# Patient Record
Sex: FEMALE | Race: WHITE | Employment: UNEMPLOYED | URBAN - METROPOLITAN AREA
[De-identification: names, ages, dates, MRNs, and addresses within clinical notes are randomized per-mention and may not be internally consistent; named-entity substitution may affect disease eponyms.]

---

## 2017-01-20 RX ORDER — MINOCYCLINE HYDROCHLORIDE 50 MG/1
CAPSULE ORAL
Qty: 60 CAPSULE | Refills: 0 | Status: SHIPPED | OUTPATIENT
Start: 2017-01-20 | End: 2017-02-23 | Stop reason: SDUPTHER

## 2017-02-23 RX ORDER — MINOCYCLINE HYDROCHLORIDE 50 MG/1
CAPSULE ORAL
Qty: 60 CAPSULE | Refills: 0 | Status: SHIPPED | OUTPATIENT
Start: 2017-02-23 | End: 2017-11-29 | Stop reason: SDUPTHER

## 2017-05-11 ENCOUNTER — OFFICE VISIT (OUTPATIENT)
Dept: DERMATOLOGY | Age: 22
End: 2017-05-11

## 2017-05-11 DIAGNOSIS — L70.0 ACNE VULGARIS: Primary | ICD-10-CM

## 2017-05-11 DIAGNOSIS — N92.0 MENORRHAGIA WITH REGULAR CYCLE: ICD-10-CM

## 2017-05-11 DIAGNOSIS — D48.5 NEOPLASM OF UNCERTAIN BEHAVIOR OF SKIN: ICD-10-CM

## 2017-05-11 PROCEDURE — 99214 OFFICE O/P EST MOD 30 MIN: CPT | Performed by: DERMATOLOGY

## 2017-05-11 PROCEDURE — 11100 PR BIOPSY OF SKIN LESION: CPT | Performed by: DERMATOLOGY

## 2017-05-11 RX ORDER — NORGESTIMATE AND ETHINYL ESTRADIOL 0.25-0.035
1 KIT ORAL DAILY
Qty: 1 PACKET | Refills: 11 | Status: SHIPPED | OUTPATIENT
Start: 2017-05-11 | End: 2019-12-17

## 2017-05-17 ENCOUNTER — TELEPHONE (OUTPATIENT)
Dept: DERMATOLOGY | Age: 22
End: 2017-05-17

## 2017-11-30 RX ORDER — MINOCYCLINE HYDROCHLORIDE 50 MG/1
CAPSULE ORAL
Qty: 60 CAPSULE | Refills: 0 | Status: SHIPPED | OUTPATIENT
Start: 2017-11-30 | End: 2018-04-06 | Stop reason: SDUPTHER

## 2018-01-17 ENCOUNTER — TELEPHONE (OUTPATIENT)
Dept: DERMATOLOGY | Age: 23
End: 2018-01-17

## 2018-01-17 NOTE — TELEPHONE ENCOUNTER
Patient called and have questions about her birthcontrol. She has had 3 periods in the last month. She also is on minocycline (MINOCIN;DYNACIN) 50 MG capsule and wondering if this is causing the period problem.     Call back # [phone]

## 2018-04-06 RX ORDER — MINOCYCLINE HYDROCHLORIDE 50 MG/1
CAPSULE ORAL
Qty: 60 CAPSULE | Refills: 0 | Status: SHIPPED | OUTPATIENT
Start: 2018-04-06 | End: 2018-05-21 | Stop reason: SDUPTHER

## 2018-05-14 ENCOUNTER — OFFICE VISIT (OUTPATIENT)
Dept: DERMATOLOGY | Age: 23
End: 2018-05-14

## 2018-05-14 DIAGNOSIS — L70.0 ACNE VULGARIS: Primary | ICD-10-CM

## 2018-05-14 PROCEDURE — 99213 OFFICE O/P EST LOW 20 MIN: CPT | Performed by: DERMATOLOGY

## 2018-05-22 RX ORDER — MINOCYCLINE HYDROCHLORIDE 50 MG/1
CAPSULE ORAL
Qty: 60 CAPSULE | Refills: 0 | Status: SHIPPED | OUTPATIENT
Start: 2018-05-22 | End: 2019-12-17 | Stop reason: SDUPTHER

## 2019-05-14 ENCOUNTER — OFFICE VISIT (OUTPATIENT)
Dept: DERMATOLOGY | Age: 24
End: 2019-05-14
Payer: COMMERCIAL

## 2019-05-14 DIAGNOSIS — Z51.81 MEDICATION MONITORING ENCOUNTER: ICD-10-CM

## 2019-05-14 DIAGNOSIS — D22.9 MULTIPLE NEVI: ICD-10-CM

## 2019-05-14 DIAGNOSIS — L70.0 ACNE VULGARIS: ICD-10-CM

## 2019-05-14 DIAGNOSIS — Z51.81 MEDICATION MONITORING ENCOUNTER: Primary | ICD-10-CM

## 2019-05-14 LAB
ALBUMIN SERPL-MCNC: 4.4 G/DL (ref 3.4–5)
ANION GAP SERPL CALCULATED.3IONS-SCNC: 14 MMOL/L (ref 3–16)
BUN BLDV-MCNC: 15 MG/DL (ref 7–20)
CALCIUM SERPL-MCNC: 9.5 MG/DL (ref 8.3–10.6)
CHLORIDE BLD-SCNC: 101 MMOL/L (ref 99–110)
CO2: 24 MMOL/L (ref 21–32)
CREAT SERPL-MCNC: 1 MG/DL (ref 0.6–1.1)
GFR AFRICAN AMERICAN: >60
GFR NON-AFRICAN AMERICAN: >60
GLUCOSE BLD-MCNC: 84 MG/DL (ref 70–99)
PHOSPHORUS: 3.6 MG/DL (ref 2.5–4.9)
POTASSIUM SERPL-SCNC: 4.4 MMOL/L (ref 3.5–5.1)
SODIUM BLD-SCNC: 139 MMOL/L (ref 136–145)

## 2019-05-14 PROCEDURE — 99214 OFFICE O/P EST MOD 30 MIN: CPT | Performed by: DERMATOLOGY

## 2019-05-14 RX ORDER — NORGESTIMATE AND ETHINYL ESTRADIOL 0.25-0.035
1 KIT ORAL DAILY
COMMUNITY

## 2019-05-14 NOTE — PROGRESS NOTES
[facility]  Mahad Malik MD  [phone]      Autumn Carcamo  1995    26 y.o. female     Date of Visit: 5/14/2019    Chief Complaint: f/u acne  Chief Complaint   Patient presents with    Acne     going fairly well-only on birth control (obgyn just filled rx) and no antibiotic x 3 mos     Last seen: ~ 1 year ago    History of Present Illness:    1. Here for f/u for acne + perioral dermatitis. Continues to flatre mildly with periods. rommel helps but hasn't taken in many mos. Has not been using topical treatments. Remains on OCP's - gyn has taken over prescribing. she previously was using benzaclin for several years with some improvement and noted that her acne was flaring with menses so she was started on an OCP at previous visit in 2014 with improvement. Also with hx of perioral dermatitis flares. No SE with rommel - no HA, no vis changes, no GI upset. 2. She has scattered asx pigmented lesions on the trunk and extremities, present for many years; no change in size/shape/color of any lesions; no bleeding lesions. No personal or family hx of skin cancer. No hx of blood clots. She does not smoke. She played soccer at college at [employer]. 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 Review of Systems:  Gen: Feels well, good sense of health. Skin: No changing moles or lesions. : no abnl menses  GI, Neuro: see above    Past Medical History, Family History, Surgical History, Medications and Allergies reviewed. History reviewed. No pertinent past medical history.     Past Surgical History:   Procedure Laterality Date    TYMPANOSTOMY TUBE PLACEMENT         Outpatient Medications Marked as Taking for the 5/14/19 encounter (Office Visit) with Emerita Tello MD   Medication Sig Dispense Refill    norgestimate-ethinyl estradiol ([address] [address]) 0.25-35 MG-MCG per tablet Take 1 tablet by mouth daily      norgestimate-ethinyl

## 2019-05-15 RX ORDER — SPIRONOLACTONE 50 MG/1
50 TABLET, FILM COATED ORAL 2 TIMES DAILY
Qty: 60 TABLET | Refills: 3 | Status: SHIPPED | OUTPATIENT
Start: 2019-05-15 | End: 2019-09-14 | Stop reason: SDUPTHER

## 2019-09-16 RX ORDER — SPIRONOLACTONE 50 MG/1
TABLET, FILM COATED ORAL
Qty: 180 TABLET | Refills: 1 | Status: SHIPPED | OUTPATIENT
Start: 2019-09-16

## 2019-10-31 ENCOUNTER — TELEPHONE (OUTPATIENT)
Dept: DERMATOLOGY | Age: 24
End: 2019-10-31

## 2019-12-17 ENCOUNTER — OFFICE VISIT (OUTPATIENT)
Dept: DERMATOLOGY | Age: 24
End: 2019-12-17
Payer: COMMERCIAL

## 2019-12-17 DIAGNOSIS — L71.9 ROSACEA: ICD-10-CM

## 2019-12-17 DIAGNOSIS — L70.0 ACNE VULGARIS: Primary | ICD-10-CM

## 2019-12-17 PROCEDURE — 99213 OFFICE O/P EST LOW 20 MIN: CPT | Performed by: DERMATOLOGY

## 2019-12-17 RX ORDER — ESCITALOPRAM OXALATE 10 MG/1
10 TABLET ORAL DAILY
COMMUNITY

## 2019-12-17 RX ORDER — MINOCYCLINE HYDROCHLORIDE 50 MG/1
CAPSULE ORAL
Qty: 60 CAPSULE | Refills: 2 | Status: SHIPPED | OUTPATIENT
Start: 2019-12-17 | End: 2020-03-17

## 2023-01-04 ENCOUNTER — TELEPHONE (OUTPATIENT)
Dept: DERMATOLOGY | Age: 28
End: 2023-01-04

## 2023-01-04 NOTE — TELEPHONE ENCOUNTER
Pt calling wanting to schedule appt for Acne last time seen was in 2019 pls return call back @ [phone] to discuss

## 2023-02-09 ENCOUNTER — OFFICE VISIT (OUTPATIENT)
Dept: DERMATOLOGY | Age: 28
End: 2023-02-09
Payer: COMMERCIAL

## 2023-02-09 DIAGNOSIS — L70.0 ACNE VULGARIS: Primary | ICD-10-CM

## 2023-02-09 DIAGNOSIS — D22.5 NEVUS OF BACK: ICD-10-CM

## 2023-02-09 DIAGNOSIS — Z51.81 MEDICATION MONITORING ENCOUNTER: ICD-10-CM

## 2023-02-09 PROCEDURE — 99204 OFFICE O/P NEW MOD 45 MIN: CPT | Performed by: DERMATOLOGY

## 2023-02-09 RX ORDER — MINOCYCLINE HYDROCHLORIDE 50 MG/1
CAPSULE ORAL
Qty: 60 CAPSULE | Refills: 0 | Status: SHIPPED | OUTPATIENT
Start: 2023-02-09

## 2023-02-09 NOTE — PROGRESS NOTES
[facility]  Krishan Horvath MD  [phone]      Liana Giraldo  1995    33 y.o. female     Date of Visit: 2/9/2023    Chief Complaint: acne  Chief Complaint   Patient presents with    Acne     Last treatment x 2.5 yrs-really flaring since October 2022     Last seen: 2019    History of Present Illness:    1. Here for f/u for acne/perioral dermatitis. Chronic but flaring more significantly - worse over the past 4-5 mos. Remains on OCP's but not helping. Tried spirono in the past but stopped after several mos b/c felt she was worsening. Mark Blank has helped in the past but hasn't taken in quite awhile. Has not been using topical treatments. Remains on OCP's - gyn has taken over prescribing. she previously was using benzaclin for several years with some improvement and noted that her acne was flaring with menses so she was started on an OCP at previous visit in 2014 with improvement. Also with hx of perioral dermatitis flares. No SE with rommel in the past - no HA, no vis changes, no GI upset. No personal or family hx of skin cancer. No hx of blood clots. She does not smoke. She played soccer at college at [employer]. 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 Now working at [employer]. Got  2022 - no plans for pregnancy yet - maybe in a year or two. 2. Mole on the L back to be checked. Asx but notes seems to be peeling off. Review of Systems:  Gen: Feels well, good sense of health. Skin: No changing moles or lesions. Past Medical History, Family History, Surgical History, Medications and Allergies reviewed. History reviewed. No pertinent past medical history.     Past Surgical History:   Procedure Laterality Date    TYMPANOSTOMY TUBE PLACEMENT         Outpatient Medications Marked as Taking for the 2/9/23 encounter (Office Visit) with Brigida George MD   Medication Sig Dispense Refill    norgestimate-ethinyl estradiol (ORTHO-CYCLEN) 0.25-35 MG-MCG per tablet Take 1 tablet by mouth daily         No Known Allergies      Physical Examination     Gen, well-appearing    Chin, cheeks > FH with erythematous papules, pustules and background erythema    L back with soft raised brown floppy nevus    2-2023          Previous photo:          Assessment and Plan     1. Acne +/- rosacea/perioral derm - chronic, flaring moderate - severe  - cont OCP - ed no smoking, clot risk, HA, irreg menses (gyn took over prescribing OCP's)    - would like to proceed with isotretinoin  Discussed course, appts, labs, risks and expectations. Ipledge process and consents reviewed. Pt educated regarding risk chelitis, dryness, epistaxis, arthralgias/myalgias, photosensitivity, HA, alopecia, liver/blood/cholesterol abnl, N/V/ab pain, mood changes and teratogeneticity. Educ no blood donations, no pregnancy for females, no med sharing. Educ no waxing, no surgery/laser up until 6-12 mos off med. Avoid extra vit A. Check CBC, liver, lipids before next f/u. Preg trest neg in office today. Repeat in 1 month.     OCP's, male condoms  No Crohn's  No contacts    - resume rommel 50 mg bid for the next month and then d/c; ed HA< vis changes, GI upset, rash    2. L back - benign nevus  - reassured re benign features  - removal prn irritation/sx

## 2023-03-13 ENCOUNTER — TELEPHONE (OUTPATIENT)
Dept: DERMATOLOGY | Age: 28
End: 2023-03-13

## 2023-03-13 ENCOUNTER — OFFICE VISIT (OUTPATIENT)
Dept: DERMATOLOGY | Age: 28
End: 2023-03-13
Payer: COMMERCIAL

## 2023-03-13 VITALS — WEIGHT: 138 LBS

## 2023-03-13 DIAGNOSIS — L70.0 ACNE VULGARIS: Primary | ICD-10-CM

## 2023-03-13 DIAGNOSIS — Z79.899 ON ISOTRETINOIN THERAPY: ICD-10-CM

## 2023-03-13 PROCEDURE — 81025 URINE PREGNANCY TEST: CPT | Performed by: DERMATOLOGY

## 2023-03-13 PROCEDURE — 99214 OFFICE O/P EST MOD 30 MIN: CPT | Performed by: DERMATOLOGY

## 2023-03-13 RX ORDER — ISOTRETINOIN 40 MG/1
CAPSULE ORAL
Qty: 60 CAPSULE | Refills: 0 | Status: SHIPPED | OUTPATIENT
Start: 2023-03-13

## 2023-03-13 NOTE — TELEPHONE ENCOUNTER
Confirmed 40 MG daily accutane directions with Miguel at [facility].     Changed quantity to 30 for 30 days.

## 2023-03-13 NOTE — TELEPHONE ENCOUNTER
Deion calling from [facility] need clarification on medication Accutane for pt pls return call back @ 21 [phone]

## 2023-03-13 NOTE — PROGRESS NOTES
[facility]  Sejal Fowler MD  [phone]      Cristino Walters  1995    33 y.o. female     Date of Visit: 3/13/2023    Chief Complaint: acne  Chief Complaint   Patient presents with    Acne     Start accutane-birth control and latex, urine pregnancy negative      Last seen: 2019    *working at [employer]; coaching soccer    History of Present Illness:    1. Here for f/u for acne/perioral dermatitis. Chronic but flaring more significantly at last visit and 4-5 mos leading up to appt. Remains on OCP's but not helping. Started rommel at last visit with improvement over the past month but also planned to proceed with isotretinoin given chronic nature of breakouts. Urine preg neg 1 month ago, baseline CBC, liver and lipids wnl last week and urine preg neg today. Ready to start. Previous hx:  Tried spirono in the past but stopped after several mos b/c felt she was worsening. Judith Rain has helped in the past but hasn't taken in quite awhile. Has not been using topical treatments. Remains on OCP's - gyn has taken over prescribing. she previously was using benzaclin for several years with some improvement and noted that her acne was flaring with menses so she was started on an OCP at previous visit in 2014 with improvement. Also with hx of perioral dermatitis flares. No SE with rommel in the past - no HA, no vis changes, no GI upset. No personal or family hx of skin cancer. No hx of blood clots. She does not smoke. She played soccer at college at [employer]. 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 Now working at [employer]. Got  2022 - no plans for pregnancy yet - maybe in a year or two. Review of Systems:  Gen: Feels well, good sense of health. Skin: No changing moles or lesions. Past Medical History, Family History, Surgical History, Medications and Allergies reviewed. History reviewed. No pertinent past medical history.     Past Surgical History:   Procedure Laterality Date    TYMPANOSTOMY TUBE PLACEMENT         Outpatient Medications Marked as Taking for the 3/13/23 encounter (Office Visit) with Rosmery Kam MD   Medication Sig Dispense Refill    escitalopram (LEXAPRO) 10 MG tablet Take 10 mg by mouth daily      norgestimate-ethinyl estradiol (ORTHO-CYCLEN) 0.25-35 MG-MCG per tablet Take 1 tablet by mouth daily         No Known Allergies      Physical Examination     Gen, well-appearing    Chin, cheeks > FH with erythematous papules, pustules previously - now significantly fewer   background erythema    2-2023          Previous photo:          Assessment and Plan     1. Acne +/- rosacea/perioral derm - chronic, flaring moderate - severe, better with rommel over the past month but very likely to flare with d/c  - cont OCP - ed no smoking, clot risk, HA, irreg menses (gyn took over prescribing OCP's)    - would like to proceed with isotretinoin  Start 40 mg daily    Discussed course, appts, labs, risks and expectations. Ipledge process and consents reviewed. Pt educated regarding risk chelitis, dryness, epistaxis, arthralgias/myalgias, photosensitivity, HA, alopecia, liver/blood/cholesterol abnl, N/V/ab pain, mood changes and teratogeneticity. Educ no blood donations, no pregnancy for females, no med sharing. Educ no waxing, no surgery/laser up until 6-12 mos off med. Avoid extra vit A. Check CBC, liver, lipids wnl last week. Preg trest neg in office today. OCP's, male condoms  No Crohn's  No contacts    - d/c rommel     F/u 1 month.

## 2023-04-18 RX ORDER — ISOTRETINOIN 30 MG/1
30 CAPSULE ORAL 2 TIMES DAILY
Qty: 60 CAPSULE | Refills: 0 | Status: SHIPPED | OUTPATIENT
Start: 2023-04-18 | End: 2023-05-18

## 2023-04-18 NOTE — TELEPHONE ENCOUNTER
[facility] pharmacy called - Ronda Tirado) they said they don't have the Accutane medication it is on back order. He said to call or send it to:   [facility] at [address] [place] [address] [address] they have it and their number is  [phone]- I did add the pharmacy to the note. He said her  date is tomorrow. Please send this Accutane medication to [address] [place] [address] rd.

## 2023-04-19 ENCOUNTER — TELEPHONE (OUTPATIENT)
Dept: DERMATOLOGY | Age: 28
End: 2023-04-19

## 2023-04-19 NOTE — TELEPHONE ENCOUNTER
[facility] is calling. They need the I-Pledge number for the Isotretinoin script.     Phone [phone]

## 2023-05-16 ENCOUNTER — OFFICE VISIT (OUTPATIENT)
Dept: DERMATOLOGY | Age: 28
End: 2023-05-16
Payer: COMMERCIAL

## 2023-05-16 DIAGNOSIS — Z79.899 ON ISOTRETINOIN THERAPY: ICD-10-CM

## 2023-05-16 DIAGNOSIS — L70.0 ACNE VULGARIS: Primary | ICD-10-CM

## 2023-05-16 LAB
CONTROL: NEGATIVE
PREGNANCY TEST URINE, POC: NEGATIVE

## 2023-05-16 PROCEDURE — 99214 OFFICE O/P EST MOD 30 MIN: CPT | Performed by: DERMATOLOGY

## 2023-05-16 PROCEDURE — 81025 URINE PREGNANCY TEST: CPT | Performed by: DERMATOLOGY

## 2023-05-16 RX ORDER — ISOTRETINOIN 30 MG/1
30 CAPSULE ORAL 2 TIMES DAILY
Qty: 60 CAPSULE | Refills: 0 | Status: SHIPPED | OUTPATIENT
Start: 2023-05-16 | End: 2023-06-15

## 2023-06-05 LAB
ALBUMIN SERPL-MCNC: 3.9 G/DL (ref 3.5–5.7)
ALP BLD-CCNC: 35 IU/L (ref 35–135)
ALT SERPL-CCNC: 8 IU/L (ref 10–60)
AST SERPL-CCNC: 13 IU/L (ref 10–40)
BILIRUB SERPL-MCNC: 0.3 MG/DL (ref 0–1.2)
BILIRUBIN DIRECT: 0 MG/DL (ref 0–0.2)
TOTAL PROTEIN: 6.7 G/DL (ref 6–8)
TRIGL SERPL-MCNC: 99 MG/DL

## 2023-06-13 ENCOUNTER — OFFICE VISIT (OUTPATIENT)
Dept: DERMATOLOGY | Age: 28
End: 2023-06-13

## 2023-06-13 DIAGNOSIS — Z79.899 ON ISOTRETINOIN THERAPY: ICD-10-CM

## 2023-06-13 DIAGNOSIS — L70.0 ACNE VULGARIS: Primary | ICD-10-CM

## 2023-06-13 LAB
CONTROL: NORMAL
PREGNANCY TEST URINE, POC: NORMAL

## 2023-06-13 RX ORDER — ISOTRETINOIN 30 MG/1
30 CAPSULE ORAL 2 TIMES DAILY
Qty: 60 CAPSULE | Refills: 0 | Status: SHIPPED | OUTPATIENT
Start: 2023-06-13 | End: 2023-07-13

## 2023-07-18 ENCOUNTER — OFFICE VISIT (OUTPATIENT)
Dept: DERMATOLOGY | Age: 28
End: 2023-07-18
Payer: COMMERCIAL

## 2023-07-18 VITALS — WEIGHT: 138.4 LBS

## 2023-07-18 DIAGNOSIS — L70.0 ACNE VULGARIS: Primary | ICD-10-CM

## 2023-07-18 DIAGNOSIS — Z79.899 ON ISOTRETINOIN THERAPY: ICD-10-CM

## 2023-07-18 LAB
CONTROL: NEGATIVE
PREGNANCY TEST URINE, POC: NEGATIVE

## 2023-07-18 PROCEDURE — 99214 OFFICE O/P EST MOD 30 MIN: CPT | Performed by: DERMATOLOGY

## 2023-07-18 PROCEDURE — 81025 URINE PREGNANCY TEST: CPT | Performed by: DERMATOLOGY

## 2023-07-18 RX ORDER — ISOTRETINOIN 30 MG/1
30 CAPSULE ORAL 2 TIMES DAILY
Qty: 60 CAPSULE | Refills: 0 | Status: SHIPPED | OUTPATIENT
Start: 2023-07-18 | End: 2023-08-17

## 2023-07-18 NOTE — PROGRESS NOTES
upset. No personal or family hx of skin cancer. No hx of blood clots. She does not smoke. 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 Now working at [employer]. Got  2022 - no plans for pregnancy yet - maybe in a year or two. Review of Systems:  Gen: Feels well, good sense of health. Skin: No changing moles or lesions. Past Medical History, Family History, Surgical History, Medications and Allergies reviewed. No past medical history on file. Past Surgical History:   Procedure Laterality Date    TYMPANOSTOMY TUBE PLACEMENT         Outpatient Medications Marked as Taking for the 7/18/23 encounter (Office Visit) with Husam De La Garza MD   Medication Sig Dispense Refill    escitalopram (LEXAPRO) 10 MG tablet Take 1 tablet by mouth daily      norgestimate-ethinyl estradiol (ORTHO-CYCLEN) 0.25-35 MG-MCG per tablet Take 1 tablet by mouth daily         No Known Allergies      Physical Examination     Gen, well-appearing    Chin, cheeks > FH with erythematous papules, pustules previously - now very few   background erythema    2-2023          Previous photo:          Assessment and Plan     1. Acne +/- rosacea/perioral derm - chronic, flaring moderate - severe, better but not at goal  - cont OCP - ed no smoking, clot risk, HA, irreg menses (gyn took over prescribing OCP's)    - cont isotretinoin 60 mg daily  Discussed course, appts, labs, risks and expectations. Ipledge process and consents reviewed. Pt educated regarding risk chelitis, dryness, epistaxis, arthralgias/myalgias, photosensitivity, HA, alopecia, liver/blood/cholesterol abnl, N/V/ab pain, mood changes and teratogeneticity. Educ no blood donations, no pregnancy for females, no med sharing. Educ no waxing, no surgery/laser up until 6-12 mos off med. Avoid extra vit A.  TG's and liver panel wnl 6-2023. Preg trest neg in office today. Repeat 1 month.     OCP's,

## 2023-08-22 ENCOUNTER — OFFICE VISIT (OUTPATIENT)
Dept: DERMATOLOGY | Age: 28
End: 2023-08-22
Payer: COMMERCIAL

## 2023-08-22 DIAGNOSIS — Z79.899 ON ISOTRETINOIN THERAPY: ICD-10-CM

## 2023-08-22 DIAGNOSIS — L70.0 ACNE VULGARIS: Primary | ICD-10-CM

## 2023-08-22 LAB
CONTROL: NEGATIVE
PREGNANCY TEST URINE, POC: NEGATIVE

## 2023-08-22 PROCEDURE — 81025 URINE PREGNANCY TEST: CPT | Performed by: DERMATOLOGY

## 2023-08-22 PROCEDURE — 99214 OFFICE O/P EST MOD 30 MIN: CPT | Performed by: DERMATOLOGY

## 2023-08-22 RX ORDER — ISOTRETINOIN 30 MG/1
30 CAPSULE ORAL 2 TIMES DAILY
Qty: 60 CAPSULE | Refills: 0 | Status: SHIPPED | OUTPATIENT
Start: 2023-08-22 | End: 2023-09-21

## 2023-08-22 NOTE — PROGRESS NOTES
[facility]  Willi Calvin MD  [phone]      Kasey Man  1995    33 y.o. female     Date of Visit: 8/22/2023    Chief Complaint: acne f/u  Chief Complaint   Patient presents with    Acne     5 mos accutane-pregnancy test today and in chart     Last seen: 7-2023    *working at [employer]; coaching soccer    History of Present Illness:    1. Here for f/u for acne + hx perioral dermatitis. Chronic but flaring more significantly at recent visit and 4-5 mos leading up to appt. Remains on OCP's but were not helping enough as monotherapy. Started on isotretinoin 5 month ago. Improved significantly. Few small occasional lesions but not at goal dose yet. Dry but tolerable. Urine preg neg today, baseline CBC, liver and lipids wnl + rpt liver enzymes + TG wnl 6/5/23    Month 1: 40 mg daily 3/13/23  Month 2: 60 mg daily 4/13/23  Month 3: 60 mg daily 5/16/23  Month 4: 60 mg daily 6/13/23 - cumulative dose is 6600 mg  Month 5: 60 mg dialy 7/18/23 -cumulative dose is 8400 mg    Goal dose for 63 kg (150 mg/kg) is 9450 mg    Isotretinoin Symptom Survey:   Dry lips: Yes    Dry eyes: No - occas using dtops  Dry skin: Yes   Muscle aches or Pains: No   Nose bleeds: No   Frequent Headaches: No   Mood swings:  No   Depression: No        Suicidal thoughts: No       Paronychia (ingrown toenails/ fingernails): No   Rash: Mild - hands better  Trouble with night vision: No  Severe sun sensitivity or sunburn: No     Previous hx:  Tried spirono in the past but stopped after several mos b/c felt she was worsening. Mardelle Purpura has helped in the past but hasn't taken in quite awhile. Has not been using topical treatments. Remains on OCP's - gyn has taken over prescribing. she previously was using benzaclin for several years with some improvement and noted that her acne was flaring with menses so she was started on an OCP at previous visit in 2014 with improvement.       Also with hx of perioral dermatitis

## 2023-09-26 ENCOUNTER — OFFICE VISIT (OUTPATIENT)
Dept: DERMATOLOGY | Age: 28
End: 2023-09-26
Payer: COMMERCIAL

## 2023-09-26 VITALS — WEIGHT: 139.6 LBS

## 2023-09-26 DIAGNOSIS — L70.0 ACNE VULGARIS: Primary | ICD-10-CM

## 2023-09-26 DIAGNOSIS — Z79.899 ON ISOTRETINOIN THERAPY: ICD-10-CM

## 2023-09-26 LAB
CONTROL: NORMAL
PREGNANCY TEST URINE, POC: NORMAL

## 2023-09-26 PROCEDURE — 81025 URINE PREGNANCY TEST: CPT | Performed by: DERMATOLOGY

## 2023-09-26 PROCEDURE — 99213 OFFICE O/P EST LOW 20 MIN: CPT | Performed by: DERMATOLOGY

## 2023-09-26 NOTE — PROGRESS NOTES
[facility]  Sylvia Bonilla MD  [phone]      Bernie Moya  1995    33 y.o. female     Date of Visit: 9/26/2023    Chief Complaint: acne f/u  Chief Complaint   Patient presents with    Acne     Last seen: 8-2023    *working at [employer]; [employer]    History of Present Illness:    1. Here for f/u for acne + hx perioral dermatitis. Chronic but flaring more significantly earlier this year and 4-5 mos leading up to appt. Remains on OCP's but were not helping enough as monotherapy. Started on isotretinoin 6 month ago. Improved significantly. Few small occasional lesions and now at goal dose. Dry but tolerable. Urine preg neg today, baseline CBC, liver and lipids wnl + rpt liver enzymes + TG wnl 6/5/23    Month 1: 40 mg daily 3/13/23  Month 2: 60 mg daily 4/13/23  Month 3: 60 mg daily 5/16/23  Month 4: 60 mg daily 6/13/23 - cumulative dose is 6600 mg  Month 5: 60 mg dialy 7/18/23 - cumulative dose is 8400 mg  Month 6: 60 mg daily 8/22/23 - cumulative dose is 10,200 mg    Goal dose for 63 kg (150 mg/kg) is 9450 mg    Isotretinoin Symptom Survey:  Dry lips: Yes  Dry eyes: No - occas using dtops  Dry skin: Yes   Muscle aches or Pains: No   Nose bleeds: Yes + dryness/scabbing  Frequent Headaches: No   Mood swings:  No   Depression: No        Suicidal thoughts: No       Rash: No   Trouble with night vision: No  Severe sun sensitivity or sunburn: No     Previous hx:  Tried spirono in the past but stopped after several mos b/c felt she was worsening. Waunita Romero has helped in the past but hasn't taken in quite awhile. Has not been using topical treatments. Remains on OCP's - gyn has taken over prescribing. she previously was using benzaclin for several years with some improvement and noted that her acne was flaring with menses so she was started on an OCP at previous visit in 2014 with improvement. Also with hx of perioral dermatitis flares.   No SE with rommel in the past - no